# Patient Record
Sex: MALE | Race: WHITE | Employment: OTHER | RURAL
[De-identification: names, ages, dates, MRNs, and addresses within clinical notes are randomized per-mention and may not be internally consistent; named-entity substitution may affect disease eponyms.]

---

## 2017-03-03 ENCOUNTER — OFFICE VISIT (OUTPATIENT)
Dept: FAMILY MEDICINE CLINIC | Age: 82
End: 2017-03-03

## 2017-03-03 VITALS
TEMPERATURE: 95.7 F | WEIGHT: 138 LBS | HEART RATE: 55 BPM | SYSTOLIC BLOOD PRESSURE: 131 MMHG | RESPIRATION RATE: 10 BRPM | OXYGEN SATURATION: 97 % | DIASTOLIC BLOOD PRESSURE: 62 MMHG | BODY MASS INDEX: 20.92 KG/M2 | HEIGHT: 68 IN

## 2017-03-03 DIAGNOSIS — I10 ESSENTIAL HYPERTENSION: ICD-10-CM

## 2017-03-03 DIAGNOSIS — E11.9 TYPE 2 DIABETES MELLITUS WITHOUT COMPLICATION, WITHOUT LONG-TERM CURRENT USE OF INSULIN (HCC): Primary | ICD-10-CM

## 2017-03-03 DIAGNOSIS — K21.00 GASTROESOPHAGEAL REFLUX DISEASE WITH ESOPHAGITIS: ICD-10-CM

## 2017-03-03 DIAGNOSIS — F32.A DEPRESSION, UNSPECIFIED DEPRESSION TYPE: ICD-10-CM

## 2017-03-03 DIAGNOSIS — E78.00 HYPERCHOLESTEROLEMIA: ICD-10-CM

## 2017-03-03 RX ORDER — LISINOPRIL 2.5 MG/1
2.5 TABLET ORAL DAILY
Qty: 90 TAB | Refills: 3 | Status: SHIPPED | OUTPATIENT
Start: 2017-03-03

## 2017-03-03 RX ORDER — PRAVASTATIN SODIUM 40 MG/1
TABLET ORAL
Qty: 45 TAB | Refills: 3 | Status: SHIPPED | OUTPATIENT
Start: 2017-03-03

## 2017-03-03 RX ORDER — CITALOPRAM 20 MG/1
20 TABLET, FILM COATED ORAL DAILY
Qty: 90 TAB | Refills: 3 | Status: SHIPPED | OUTPATIENT
Start: 2017-03-03

## 2017-03-03 RX ORDER — METFORMIN HYDROCHLORIDE 500 MG/1
TABLET ORAL
Qty: 90 TAB | Refills: 3 | Status: SHIPPED | OUTPATIENT
Start: 2017-03-03

## 2017-03-03 RX ORDER — RANITIDINE 150 MG/1
150 TABLET, FILM COATED ORAL 2 TIMES DAILY
Qty: 180 TAB | Refills: 3 | Status: SHIPPED | OUTPATIENT
Start: 2017-03-03

## 2017-03-03 RX ORDER — GLIPIZIDE 10 MG/1
TABLET ORAL
Qty: 90 TAB | Refills: 3 | Status: SHIPPED | OUTPATIENT
Start: 2017-03-03

## 2017-03-03 NOTE — PROGRESS NOTES
Health Maintenance Due   Topic Date Due    DTaP/Tdap/Td series (1 - Tdap) [birth year]22/1950    ZOSTER VACCINE AGE 60>  09/22/1989 will consider getting one    EYE EXAM RETINAL OR DILATED Q1  09/10/2016

## 2017-03-03 NOTE — MR AVS SNAPSHOT
Visit Information Date & Time Provider Department Dept. Phone Encounter #  
 3/3/2017  8:20 AM Jaime Bird MD [address] [place] [address] Avenue [phone] [id number] Follow-up Instructions Return in about 6 months (around 9/3/2017). Follow-up and Disposition History Upcoming Health Maintenance Date Due DTaP/Tdap/Td series (1 - Tdap) 9/22/1950 ZOSTER VACCINE AGE 60> 9/22/1989 EYE EXAM RETINAL OR DILATED Q1 9/10/2016 HEMOGLOBIN A1C Q6M 4/14/2017 MICROALBUMIN Q1 4/20/2017 MEDICARE YEARLY EXAM 4/21/2017 FOOT EXAM Q1 7/14/2017 GLAUCOMA SCREENING Q2Y 9/10/2017 LIPID PANEL Q1 10/14/2017 Allergies as of 3/3/2017  Review Complete On: 3/3/2017 By: Brianna Moore LPN Severity Noted Reaction Type Reactions Rocephin [Ceftriaxone]  07/26/2013    Unknown (comments) Current Immunizations  Reviewed on 12/18/2015 Name Date Influenza High Dose Vaccine PF 10/14/2016, 12/18/2015 Influenza Vaccine 10/21/2014, 10/3/2013 Pneumococcal Conjugate (PCV-13) 10/14/2016 Pneumococcal Vaccine (Unspecified Type) 11/26/2007 Not reviewed this visit You Were Diagnosed With   
  
 Codes Comments Type 2 diabetes mellitus without complication, without long-term current use of insulin (HCC)    -  Primary ICD-10-CM: E11.9 ICD-9-CM: 250.00 Hypercholesterolemia     ICD-10-CM: E78.00 ICD-9-CM: 272.0 Depression, unspecified depression type     ICD-10-CM: F32.9 ICD-9-CM: 912 Gastroesophageal reflux disease with esophagitis     ICD-10-CM: K21.0 ICD-9-CM: 530.11 Essential hypertension     ICD-10-CM: I10 
ICD-9-CM: 401.9 Vitals BP  
  
  
  
  
  
 131/62 (BP 1 Location: Right arm, BP Patient Position: Sitting) BMI and BSA Data Body Mass Index Body Surface Area  
 20.98 kg/m 2 1.73 m 2 [phone] Your Updated Medication List  
  
   
This list is accurate as of: 3/3/17  9:25 AM.  Always use your most recent med list.  
  
  
  
  
 aspirin, buffered 81 mg Tab Take  by mouth.  
  
 citalopram 20 mg tablet Commonly known as:  Michaela Boothe Take 1 Tab by mouth daily. glipiZIDE 10 mg tablet Commonly known as:  Malagasy Tong Take 1/2 tab in AM and 1/2 tab in PM  
  
 lisinopril 2.5 mg tablet Commonly known as:  Aundra Herb Take 1 Tab by mouth daily. metFORMIN 500 mg tablet Commonly known as:  GLUCOPHAGE Take 1/2 tab bid  
  
 pravastatin 40 mg tablet Commonly known as:  PRAVACHOL Take half at night  
  
 raNITIdine 150 mg tablet Commonly known as:  ZANTAC Take 1 Tab by mouth two (2) times a day. VITAMIN D3 1,000 unit tablet Generic drug:  cholecalciferol Take  by mouth daily. Prescriptions Sent to Pharmacy Refills  
 glipiZIDE (GLUCOTROL) 10 mg tablet 3 Sig: Take 1/2 tab in AM and 1/2 tab in PM  
 Class: Normal  
 Pharmacy: [address], [address] Ph #: [phone]  
 raNITIdine (ZANTAC) 150 mg tablet 3 Sig: Take 1 Tab by mouth two (2) times a day. Class: Normal  
 Pharmacy: [address], [address] Ph #: [phone] Route: Oral  
 lisinopril (PRINIVIL, ZESTRIL) 2.5 mg tablet 3 Sig: Take 1 Tab by mouth daily. Class: Normal  
 Pharmacy: [address], [address] Ph #: [phone] Route: Oral  
 citalopram (CELEXA) 20 mg tablet 3 Sig: Take 1 Tab by mouth daily. Class: Normal  
 Pharmacy: [address], [address] Ph #: [phone] Route: Oral  
 pravastatin (PRAVACHOL) 40 mg tablet 3 Sig: Take half at night  Class: Normal  
 Pharmacy: [address], [address] [place] [address] Ph #: [phone]  
 metFORMIN (GLUCOPHAGE) 500 mg tablet 3 Sig: Take 1/2 tab bid Class: Normal  
 Pharmacy: [address], [address] Ph #: [phone] We Performed the Following HEMOGLOBIN A1C W/O EAG [41642 CPT(R)] LIPID PANEL WITH LDL/HDL RATIO [00216 CPT(R)] METABOLIC PANEL, COMPREHENSIVE [84209 CPT(R)] IA COLLECTION VENOUS BLOOD,VENIPUNCTURE [id number] CPT(R)] IA HANDLG&/OR CONVEY OF SPEC FOR TR OFFICE TO LAB [48486 CPT(R)] Follow-up Instructions Return in about 6 months (around 9/3/2017). Introducing [facility]! Dear Nelly Morel: Thank you for requesting a [employer] account. Our records indicate that you already have an active [employer] account. You can access your account anytime at [email or web address] Did you know that you can access your hospital and ER discharge instructions at any time in [employer]? You can also review all of your test results from your hospital stay or ER visit. Additional Information If you have questions, please visit the Frequently Asked Questions section of the [employer] website at [email or web address] Remember, [employer] is NOT to be used for urgent needs. For medical emergencies, dial 911. Now available from your iPhone and Android! Please provide this summary of care documentation to your next provider. Your primary care clinician is listed as Marielle Anderson. If you have any questions after today's visit, please call [phone].

## 2017-03-04 LAB
ALBUMIN SERPL-MCNC: 4.2 G/DL (ref 3.5–4.7)
ALBUMIN/GLOB SERPL: 1.4 {RATIO} (ref 1.1–2.5)
ALP SERPL-CCNC: 54 IU/L (ref 39–117)
ALT SERPL-CCNC: 9 IU/L (ref 0–44)
AST SERPL-CCNC: 15 IU/L (ref 0–40)
BILIRUB SERPL-MCNC: 0.4 MG/DL (ref 0–1.2)
BUN SERPL-MCNC: 23 MG/DL (ref 8–27)
BUN/CREAT SERPL: 24 (ref 10–22)
CALCIUM SERPL-MCNC: 9.3 MG/DL (ref 8.6–10.2)
CHLORIDE SERPL-SCNC: 99 MMOL/L (ref 96–106)
CHOLEST SERPL-MCNC: 138 MG/DL (ref 100–199)
CO2 SERPL-SCNC: 24 MMOL/L (ref 18–29)
CREAT SERPL-MCNC: 0.97 MG/DL (ref 0.76–1.27)
GLOBULIN SER CALC-MCNC: 3.1 G/DL (ref 1.5–4.5)
GLUCOSE SERPL-MCNC: 107 MG/DL (ref 65–99)
HBA1C MFR BLD: 6.3 % (ref 4.8–5.6)
HDLC SERPL-MCNC: 40 MG/DL
INTERPRETATION, 910389: NORMAL
LDLC SERPL CALC-MCNC: 70 MG/DL (ref 0–99)
LDLC/HDLC SERPL: 1.8 RATIO UNITS (ref 0–3.6)
POTASSIUM SERPL-SCNC: 5 MMOL/L (ref 3.5–5.2)
PROT SERPL-MCNC: 7.3 G/DL (ref 6–8.5)
SODIUM SERPL-SCNC: 138 MMOL/L (ref 134–144)
TRIGL SERPL-MCNC: 139 MG/DL (ref 0–149)
VLDLC SERPL CALC-MCNC: 28 MG/DL (ref 5–40)

## 2017-03-05 NOTE — PROGRESS NOTES
Call pt, the Chol is great  The kidney and liver tests are normal  The HbA1C is 6.3, very good  Cont current meds and diet and recheck in 6 mo